# Patient Record
Sex: MALE | Employment: UNEMPLOYED | URBAN - METROPOLITAN AREA
[De-identification: names, ages, dates, MRNs, and addresses within clinical notes are randomized per-mention and may not be internally consistent; named-entity substitution may affect disease eponyms.]

---

## 2019-07-11 ENCOUNTER — HOSPITAL ENCOUNTER (INPATIENT)
Age: 39
LOS: 3 days | Discharge: LEFT BEFORE DISCHARGE | DRG: 179 | End: 2019-07-15
Attending: EMERGENCY MEDICINE | Admitting: INTERNAL MEDICINE

## 2019-07-11 DIAGNOSIS — R00.2 PALPITATIONS: ICD-10-CM

## 2019-07-11 DIAGNOSIS — J98.4 CAVITARY LESION OF LUNG: Primary | ICD-10-CM

## 2019-07-11 DIAGNOSIS — R42 VERTIGO: ICD-10-CM

## 2019-07-11 PROCEDURE — 93005 ELECTROCARDIOGRAM TRACING: CPT | Performed by: EMERGENCY MEDICINE

## 2019-07-11 PROCEDURE — 99285 EMERGENCY DEPT VISIT HI MDM: CPT

## 2019-07-12 ENCOUNTER — APPOINTMENT (OUTPATIENT)
Dept: CT IMAGING | Age: 39
DRG: 179 | End: 2019-07-12

## 2019-07-12 ENCOUNTER — APPOINTMENT (OUTPATIENT)
Dept: GENERAL RADIOLOGY | Age: 39
DRG: 179 | End: 2019-07-12

## 2019-07-12 PROBLEM — A15.0 PULMONARY TUBERCULOSIS: Status: ACTIVE | Noted: 2019-07-12

## 2019-07-12 PROBLEM — J98.4 PULMONARY CAVITARY LESION: Status: ACTIVE | Noted: 2019-07-12

## 2019-07-12 LAB
A/G RATIO: 1.5 (ref 1.1–2.2)
ALBUMIN SERPL-MCNC: 4.8 G/DL (ref 3.4–5)
ALP BLD-CCNC: 88 U/L (ref 40–129)
ALT SERPL-CCNC: 8 U/L (ref 10–40)
ANION GAP SERPL CALCULATED.3IONS-SCNC: 13 MMOL/L (ref 3–16)
APPEARANCE BAL (LAVAGE): ABNORMAL
AST SERPL-CCNC: 17 U/L (ref 15–37)
BASOPHILS ABSOLUTE: 0 K/UL (ref 0–0.2)
BASOPHILS RELATIVE PERCENT: 0.8 %
BILIRUB SERPL-MCNC: 0.3 MG/DL (ref 0–1)
BUN BLDV-MCNC: 10 MG/DL (ref 7–20)
C-REACTIVE PROTEIN: 0.5 MG/L (ref 0–5.1)
CALCIUM SERPL-MCNC: 10.1 MG/DL (ref 8.3–10.6)
CHLORIDE BLD-SCNC: 105 MMOL/L (ref 99–110)
CLOT EVALUATION BAL: ABNORMAL
CO2: 26 MMOL/L (ref 21–32)
COLOR LAVAGE: ABNORMAL
CREAT SERPL-MCNC: 0.7 MG/DL (ref 0.9–1.3)
D DIMER: 363 NG/ML DDU (ref 0–229)
EKG ATRIAL RATE: 75 BPM
EKG DIAGNOSIS: NORMAL
EKG P AXIS: -25 DEGREES
EKG P-R INTERVAL: 152 MS
EKG Q-T INTERVAL: 352 MS
EKG QRS DURATION: 80 MS
EKG QTC CALCULATION (BAZETT): 393 MS
EKG R AXIS: 77 DEGREES
EKG T AXIS: 60 DEGREES
EKG VENTRICULAR RATE: 75 BPM
EOSIN: 2 %
EOSINOPHILS ABSOLUTE: 0.1 K/UL (ref 0–0.6)
EOSINOPHILS RELATIVE PERCENT: 1.2 %
EPITHELIAL CELLS FLUID: 3 %
ETHANOL: NORMAL MG/DL (ref 0–0.08)
GFR AFRICAN AMERICAN: >60
GFR NON-AFRICAN AMERICAN: >60
GLOBULIN: 3.1 G/DL
GLUCOSE BLD-MCNC: 101 MG/DL (ref 70–99)
HAV IGM SER IA-ACNC: NORMAL
HCT VFR BLD CALC: 43.9 % (ref 40.5–52.5)
HEMOGLOBIN: 14.9 G/DL (ref 13.5–17.5)
HEPATITIS B CORE IGM ANTIBODY: NORMAL
HEPATITIS B SURFACE ANTIGEN INTERPRETATION: NORMAL
HEPATITIS C ANTIBODY INTERPRETATION: NORMAL
HIV AG/AB: NORMAL
HIV ANTIGEN: NORMAL
HIV-1 ANTIBODY: NORMAL
HIV-2 AB: NORMAL
LIPASE: 19 U/L (ref 13–60)
LYMPHOCYTES ABSOLUTE: 2.5 K/UL (ref 1–5.1)
LYMPHOCYTES RELATIVE PERCENT: 41.7 %
LYMPHOCYTES, BAL: 9 % (ref 5–10)
MACROPHAGES, BAL: 13 % (ref 90–95)
MCH RBC QN AUTO: 32.1 PG (ref 26–34)
MCHC RBC AUTO-ENTMCNC: 33.9 G/DL (ref 31–36)
MCV RBC AUTO: 94.6 FL (ref 80–100)
MONOCYTES ABSOLUTE: 0.5 K/UL (ref 0–1.3)
MONOCYTES RELATIVE PERCENT: 7.8 %
MONOCYTES, BAL: 2 %
NEUTROPHILS ABSOLUTE: 2.9 K/UL (ref 1.7–7.7)
NEUTROPHILS RELATIVE PERCENT: 48.5 %
NUMBER OF CELLS COUNTED BAL (LAVAGE): 200
PDW BLD-RTO: 12.6 % (ref 12.4–15.4)
PLATELET # BLD: 243 K/UL (ref 135–450)
PMV BLD AUTO: 8 FL (ref 5–10.5)
POTASSIUM SERPL-SCNC: 4.2 MMOL/L (ref 3.5–5.1)
PROCALCITONIN: 0.02 NG/ML (ref 0–0.15)
RBC # BLD: 4.64 M/UL (ref 4.2–5.9)
RBC, BAL: 933 /CUMM
SEDIMENTATION RATE, ERYTHROCYTE: 11 MM/HR (ref 0–15)
SEGMENTED NEUTROPHILS, BAL: 71 % (ref 5–10)
SODIUM BLD-SCNC: 144 MMOL/L (ref 136–145)
TOTAL PROTEIN: 7.9 G/DL (ref 6.4–8.2)
TROPONIN: <0.01 NG/ML
VOLUME LAVAGE: 37 ML
WBC # BLD: 6.1 K/UL (ref 4–11)
WBC/EPI CELLS BAL: 147 /CUMM

## 2019-07-12 PROCEDURE — 87206 SMEAR FLUORESCENT/ACID STAI: CPT

## 2019-07-12 PROCEDURE — 80053 COMPREHEN METABOLIC PANEL: CPT

## 2019-07-12 PROCEDURE — 6360000002 HC RX W HCPCS: Performed by: EMERGENCY MEDICINE

## 2019-07-12 PROCEDURE — 88305 TISSUE EXAM BY PATHOLOGIST: CPT

## 2019-07-12 PROCEDURE — 87116 MYCOBACTERIA CULTURE: CPT

## 2019-07-12 PROCEDURE — 0B9C7ZX DRAINAGE OF RIGHT UPPER LUNG LOBE, VIA NATURAL OR ARTIFICIAL OPENING, DIAGNOSTIC: ICD-10-PCS | Performed by: INTERNAL MEDICINE

## 2019-07-12 PROCEDURE — 70450 CT HEAD/BRAIN W/O DYE: CPT

## 2019-07-12 PROCEDURE — 7100000011 HC PHASE II RECOVERY - ADDTL 15 MIN: Performed by: INTERNAL MEDICINE

## 2019-07-12 PROCEDURE — 85025 COMPLETE CBC W/AUTO DIFF WBC: CPT

## 2019-07-12 PROCEDURE — 80074 ACUTE HEPATITIS PANEL: CPT

## 2019-07-12 PROCEDURE — 2500000003 HC RX 250 WO HCPCS: Performed by: INTERNAL MEDICINE

## 2019-07-12 PROCEDURE — 99152 MOD SED SAME PHYS/QHP 5/>YRS: CPT | Performed by: INTERNAL MEDICINE

## 2019-07-12 PROCEDURE — 87390 HIV-1 AG IA: CPT

## 2019-07-12 PROCEDURE — 2580000003 HC RX 258: Performed by: EMERGENCY MEDICINE

## 2019-07-12 PROCEDURE — 36415 COLL VENOUS BLD VENIPUNCTURE: CPT

## 2019-07-12 PROCEDURE — 84484 ASSAY OF TROPONIN QUANT: CPT

## 2019-07-12 PROCEDURE — 87081 CULTURE SCREEN ONLY: CPT

## 2019-07-12 PROCEDURE — 93010 ELECTROCARDIOGRAM REPORT: CPT | Performed by: INTERNAL MEDICINE

## 2019-07-12 PROCEDURE — 99255 IP/OBS CONSLTJ NEW/EST HI 80: CPT | Performed by: INTERNAL MEDICINE

## 2019-07-12 PROCEDURE — 6370000000 HC RX 637 (ALT 250 FOR IP): Performed by: INTERNAL MEDICINE

## 2019-07-12 PROCEDURE — 86702 HIV-2 ANTIBODY: CPT

## 2019-07-12 PROCEDURE — 31624 DX BRONCHOSCOPE/LAVAGE: CPT | Performed by: INTERNAL MEDICINE

## 2019-07-12 PROCEDURE — 2709999900 HC NON-CHARGEABLE SUPPLY: Performed by: INTERNAL MEDICINE

## 2019-07-12 PROCEDURE — 88312 SPECIAL STAINS GROUP 1: CPT

## 2019-07-12 PROCEDURE — 87252 VIRUS INOCULATION TISSUE: CPT

## 2019-07-12 PROCEDURE — 85652 RBC SED RATE AUTOMATED: CPT

## 2019-07-12 PROCEDURE — 71260 CT THORAX DX C+: CPT

## 2019-07-12 PROCEDURE — 1200000000 HC SEMI PRIVATE

## 2019-07-12 PROCEDURE — 71046 X-RAY EXAM CHEST 2 VIEWS: CPT

## 2019-07-12 PROCEDURE — 96374 THER/PROPH/DIAG INJ IV PUSH: CPT

## 2019-07-12 PROCEDURE — 6360000004 HC RX CONTRAST MEDICATION: Performed by: EMERGENCY MEDICINE

## 2019-07-12 PROCEDURE — 96361 HYDRATE IV INFUSION ADD-ON: CPT

## 2019-07-12 PROCEDURE — 84145 PROCALCITONIN (PCT): CPT

## 2019-07-12 PROCEDURE — 3609010800 HC BRONCHOSCOPY ALVEOLAR LAVAGE: Performed by: INTERNAL MEDICINE

## 2019-07-12 PROCEDURE — 89051 BODY FLUID CELL COUNT: CPT

## 2019-07-12 PROCEDURE — 87015 SPECIMEN INFECT AGNT CONCNTJ: CPT

## 2019-07-12 PROCEDURE — 87205 SMEAR GRAM STAIN: CPT

## 2019-07-12 PROCEDURE — 6360000002 HC RX W HCPCS: Performed by: INTERNAL MEDICINE

## 2019-07-12 PROCEDURE — 85379 FIBRIN DEGRADATION QUANT: CPT

## 2019-07-12 PROCEDURE — 87102 FUNGUS ISOLATION CULTURE: CPT

## 2019-07-12 PROCEDURE — 86140 C-REACTIVE PROTEIN: CPT

## 2019-07-12 PROCEDURE — 6370000000 HC RX 637 (ALT 250 FOR IP): Performed by: EMERGENCY MEDICINE

## 2019-07-12 PROCEDURE — 7100000010 HC PHASE II RECOVERY - FIRST 15 MIN: Performed by: INTERNAL MEDICINE

## 2019-07-12 PROCEDURE — 88112 CYTOPATH CELL ENHANCE TECH: CPT

## 2019-07-12 PROCEDURE — 86480 TB TEST CELL IMMUN MEASURE: CPT

## 2019-07-12 PROCEDURE — 6370000000 HC RX 637 (ALT 250 FOR IP): Performed by: NURSE PRACTITIONER

## 2019-07-12 PROCEDURE — 83690 ASSAY OF LIPASE: CPT

## 2019-07-12 PROCEDURE — 86701 HIV-1ANTIBODY: CPT

## 2019-07-12 PROCEDURE — 87070 CULTURE OTHR SPECIMN AEROBIC: CPT

## 2019-07-12 PROCEDURE — 2580000003 HC RX 258: Performed by: INTERNAL MEDICINE

## 2019-07-12 PROCEDURE — G0480 DRUG TEST DEF 1-7 CLASSES: HCPCS

## 2019-07-12 RX ORDER — FENTANYL CITRATE 50 UG/ML
INJECTION, SOLUTION INTRAMUSCULAR; INTRAVENOUS
Status: COMPLETED | OUTPATIENT
Start: 2019-07-12 | End: 2019-07-12

## 2019-07-12 RX ORDER — LANOLIN ALCOHOL/MO/W.PET/CERES
3 CREAM (GRAM) TOPICAL NIGHTLY PRN
Status: DISCONTINUED | OUTPATIENT
Start: 2019-07-12 | End: 2019-07-15 | Stop reason: HOSPADM

## 2019-07-12 RX ORDER — SODIUM CHLORIDE 0.9 % (FLUSH) 0.9 %
10 SYRINGE (ML) INJECTION EVERY 12 HOURS SCHEDULED
Status: DISCONTINUED | OUTPATIENT
Start: 2019-07-12 | End: 2019-07-15 | Stop reason: HOSPADM

## 2019-07-12 RX ORDER — 0.9 % SODIUM CHLORIDE 0.9 %
1000 INTRAVENOUS SOLUTION INTRAVENOUS ONCE
Status: COMPLETED | OUTPATIENT
Start: 2019-07-12 | End: 2019-07-12

## 2019-07-12 RX ORDER — POTASSIUM CHLORIDE 7.45 MG/ML
10 INJECTION INTRAVENOUS PRN
Status: DISCONTINUED | OUTPATIENT
Start: 2019-07-12 | End: 2019-07-15 | Stop reason: HOSPADM

## 2019-07-12 RX ORDER — MIDAZOLAM HYDROCHLORIDE 1 MG/ML
INJECTION INTRAMUSCULAR; INTRAVENOUS
Status: COMPLETED | OUTPATIENT
Start: 2019-07-12 | End: 2019-07-12

## 2019-07-12 RX ORDER — SODIUM CHLORIDE 0.9 % (FLUSH) 0.9 %
10 SYRINGE (ML) INJECTION PRN
Status: DISCONTINUED | OUTPATIENT
Start: 2019-07-12 | End: 2019-07-15 | Stop reason: HOSPADM

## 2019-07-12 RX ORDER — METRONIDAZOLE 500 MG/1
500 TABLET ORAL EVERY 8 HOURS SCHEDULED
Status: DISCONTINUED | OUTPATIENT
Start: 2019-07-12 | End: 2019-07-14

## 2019-07-12 RX ORDER — NICOTINE 21 MG/24HR
1 PATCH, TRANSDERMAL 24 HOURS TRANSDERMAL DAILY
Status: DISCONTINUED | OUTPATIENT
Start: 2019-07-12 | End: 2019-07-12

## 2019-07-12 RX ORDER — HYDROXYZINE PAMOATE 25 MG/1
50 CAPSULE ORAL 3 TIMES DAILY PRN
Status: DISCONTINUED | OUTPATIENT
Start: 2019-07-12 | End: 2019-07-15 | Stop reason: HOSPADM

## 2019-07-12 RX ORDER — ONDANSETRON 2 MG/ML
4 INJECTION INTRAMUSCULAR; INTRAVENOUS EVERY 6 HOURS PRN
Status: DISCONTINUED | OUTPATIENT
Start: 2019-07-12 | End: 2019-07-15 | Stop reason: HOSPADM

## 2019-07-12 RX ORDER — LIDOCAINE HYDROCHLORIDE 20 MG/ML
INJECTION, SOLUTION EPIDURAL; INFILTRATION; INTRACAUDAL; PERINEURAL
Status: COMPLETED | OUTPATIENT
Start: 2019-07-12 | End: 2019-07-12

## 2019-07-12 RX ORDER — LORAZEPAM 2 MG/ML
1 INJECTION INTRAMUSCULAR ONCE
Status: COMPLETED | OUTPATIENT
Start: 2019-07-12 | End: 2019-07-12

## 2019-07-12 RX ORDER — MAGNESIUM SULFATE 1 G/100ML
1 INJECTION INTRAVENOUS PRN
Status: DISCONTINUED | OUTPATIENT
Start: 2019-07-12 | End: 2019-07-15 | Stop reason: HOSPADM

## 2019-07-12 RX ORDER — NICOTINE 21 MG/24HR
1 PATCH, TRANSDERMAL 24 HOURS TRANSDERMAL DAILY
Status: DISCONTINUED | OUTPATIENT
Start: 2019-07-12 | End: 2019-07-15 | Stop reason: HOSPADM

## 2019-07-12 RX ADMIN — LORAZEPAM 1 MG: 2 INJECTION INTRAMUSCULAR; INTRAVENOUS at 00:38

## 2019-07-12 RX ADMIN — METRONIDAZOLE 500 MG: 500 TABLET, FILM COATED ORAL at 16:42

## 2019-07-12 RX ADMIN — IOVERSOL 100 ML: 678 INJECTION INTRA-ARTERIAL; INTRAVENOUS at 01:18

## 2019-07-12 RX ADMIN — Medication 3 MG: at 21:46

## 2019-07-12 RX ADMIN — SODIUM CHLORIDE 1000 ML: 9 INJECTION, SOLUTION INTRAVENOUS at 00:38

## 2019-07-12 RX ADMIN — METRONIDAZOLE 500 MG: 500 TABLET, FILM COATED ORAL at 21:46

## 2019-07-12 RX ADMIN — SODIUM CHLORIDE, PRESERVATIVE FREE 10 ML: 5 INJECTION INTRAVENOUS at 09:07

## 2019-07-12 ASSESSMENT — PAIN DESCRIPTION - DESCRIPTORS: DESCRIPTORS: ACHING

## 2019-07-12 ASSESSMENT — ENCOUNTER SYMPTOMS
COLOR CHANGE: 0
FACIAL SWELLING: 0
VOMITING: 0
ABDOMINAL PAIN: 0
PHOTOPHOBIA: 0
BLOOD IN STOOL: 0
NAUSEA: 0
BACK PAIN: 0
TROUBLE SWALLOWING: 0
VOICE CHANGE: 0
SHORTNESS OF BREATH: 1
STRIDOR: 0
WHEEZING: 0

## 2019-07-12 ASSESSMENT — PAIN DESCRIPTION - FREQUENCY: FREQUENCY: CONTINUOUS

## 2019-07-12 ASSESSMENT — PAIN DESCRIPTION - ONSET: ONSET: ON-GOING

## 2019-07-12 ASSESSMENT — PAIN SCALES - GENERAL
PAINLEVEL_OUTOF10: 3
PAINLEVEL_OUTOF10: 0

## 2019-07-12 ASSESSMENT — PAIN DESCRIPTION - PROGRESSION
CLINICAL_PROGRESSION: NOT CHANGED

## 2019-07-12 ASSESSMENT — PAIN DESCRIPTION - ORIENTATION: ORIENTATION: LEFT;RIGHT

## 2019-07-12 ASSESSMENT — PAIN - FUNCTIONAL ASSESSMENT
PAIN_FUNCTIONAL_ASSESSMENT: ACTIVITIES ARE NOT PREVENTED
PAIN_FUNCTIONAL_ASSESSMENT: 0-10

## 2019-07-12 ASSESSMENT — PAIN DESCRIPTION - LOCATION: LOCATION: CHEST

## 2019-07-12 ASSESSMENT — PAIN DESCRIPTION - PAIN TYPE: TYPE: ACUTE PAIN

## 2019-07-12 NOTE — ED NOTES
Acknowledged pt by pt's name. Verified pt by name and date of birth. Checked arm band, allergies, reviewed past medical history. Introduced myself to patient  Duration of ED plan of care explained to patient  Explained planned tests and procedures  Thanked patient for coming to [facility].    Asked if there was anything else I could do for the patient before exiting room. CB in reach.      Garth Yee RN  07/12/19 5790

## 2019-07-12 NOTE — CONSULTS
PATIENT IS SEEN AT THE REQUEST OF DR. Jen David for pulmonary TB    CONSULTING PHYSICIAN: Esa     HISTORY OF PRESENT ILLNESS:  This is a 43 y.o. male who presented to the ED on 7/11 with a CC of dizziness. Seems to be having a lot of complaints. Dizziness, chest wall pain, cough and fatigue. He is from [facility] and would not directly answer my question with regards to TB risk factors. He denied having a positive skin test or knowing anyone with TB. [place].  He denies being homeless, denies being incarcerate, denies IV drug use or HIV risks. Denies any medical problems. .      Established Pulmonologist:      PAST MEDICAL HISTORY:  History reviewed. No pertinent past medical history. PAST SURGICAL HISTORY:  History reviewed. No pertinent surgical history. FAMILY HISTORY:  Unclear     SOCIAL HISTORY:   reports that he has been smoking cigarettes. He has been smoking about 1.00 pack per day. He has never used smokeless tobacco. Smokes marijuana. Uses ETOH but does not abuse it. Told RN he had vaccine for TB (he did not tell me that)    Scheduled Meds:   nicotine  1 patch Transdermal Daily    sodium chloride flush  10 mL Intravenous 2 times per day    enoxaparin  40 mg Subcutaneous Daily       Continuous Infusions:      PRN Meds:  sodium chloride flush, ondansetron, potassium chloride, magnesium sulfate    ALLERGIES:  Patient is allergic to penicillins.     REVIEW OF SYSTEMS:  Constitutional: Negative for fever or chills  HENT: Negative for sore throat  Eyes: Negative for redness   Respiratory: Cough  Cardiovascular: left sided chest wall pains  Gastrointestinal: Negative for vomiting, diarrhea   Genitourinary: Negative for hematuria, negative for dysuria  Musculoskeletal: Negative for arthralgias   Skin: Negative for rash  Neurological: Negative for syncope  Hematological: Negative for adenopathy  Extremities:  Negative for swelling    PHYSICAL
Value Date    CREATININE 0.7 (L) 07/12/2019    BUN 10 07/12/2019     07/12/2019    K 4.2 07/12/2019     07/12/2019    CO2 26 07/12/2019       Hepatic Function Panel:   Lab Results   Component Value Date    ALKPHOS 88 07/12/2019    ALT 8 07/12/2019    AST 17 07/12/2019    PROT 7.9 07/12/2019    BILITOT 0.3 07/12/2019    LABALBU 4.8 07/12/2019     UA:  Lab Results   Component Value Date    COLORU YELLOW 10/20/2016    CLARITYU CLOUDY 10/20/2016    GLUCOSEU Negative 10/20/2016    BILIRUBINUR Negative 10/20/2016    KETUA Negative 10/20/2016    SPECGRAV 1.018 10/20/2016    BLOODU Negative 10/20/2016    PHUR 8.0 10/20/2016    PROTEINU Negative 10/20/2016    UROBILINOGEN 1.0 10/20/2016    NITRU Negative 10/20/2016    LEUKOCYTESUR Negative 10/20/2016    LABMICR YES 10/20/2016    URINETYPE Not Specified 10/20/2016      Urine Microscopic:   Lab Results   Component Value Date    HYALCAST 0 10/20/2016    WBCUA 0 10/20/2016    RBCUA 6 10/20/2016    EPIU 0 10/20/2016         Scheduled Meds:   nicotine  1 patch Transdermal Daily    sodium chloride flush  10 mL Intravenous 2 times per day    enoxaparin  40 mg Subcutaneous Daily       Continuous Infusions:      PRN Meds:  sodium chloride flush, ondansetron, potassium chloride, magnesium sulfate    MICRO: cultures reviewed and updated by me          Natty Zavala [[id number]] Collected: 07/12/19 1045   Order Status: Sent Specimen: Blood Updated: 07/12/19 1045   Quantiferon, Incubated [[id number]]    Order Status: Canceled Specimen: Blood          Urine Culture  No results for input(s): LABURIN in the last 72 hours. Imaging:   CT CHEST PULMONARY EMBOLISM W CONTRAST   Final Result   1. No evidence of pulmonary embolism   2. Cavitary nodular infiltrate in the right lung apex having the appearance   of an infectious process. Tuberculosis is a consideration         XR CHEST STANDARD (2 VW)   Final Result   1. No active cardiopulmonary disease   2.  Right apical lung

## 2019-07-12 NOTE — H&P
Hospital Medicine History & Physical      PCP: No primary care provider on file. Date of Admission: 7/11/2019    Date of Service: Pt seen/examined on 7/12/2019 and Admitted to Inpatient. Chief Complaint:  Weakness, fatigue      History Of Present Illness: The patient is a 43 y.o. male with hx tobacco abuse who presents to [facility] with weakness and fatigue. Patient states that over the past three or four days, he has not felt well, with increased fatigue and dizziness. States that he has had to turn up the air conditioner in his car and at home because he was feeling warm. Also having chills. Also having some left sided chest tightness that feels like a spasm to him. Originally from [facility] and emigrated 3 years ago to the [facility] and has lived here since. Since any known exposure to TB, denies any employment in the [employer] or being in care home or homeless. Denies IVDU. States he was tested for TB and HIV in [facility] and was negative. In the ED, pt had a CT Chest that showed a right apical cavitary lesion. He is being admitted for further evaluation. Past Medical History:    Tobacco abuse    Past Surgical History:    No past surgeries    Medications Prior to Admission:    No medications    Allergies:  Penicillins    Social History:  The patient currently lives at home    TOBACCO:   reports that he has been smoking cigarettes. He has been smoking about 1.00 pack per day. He has never used smokeless tobacco.  ETOH:   reports that he drinks alcohol. Family History:  Reviewed in detail and negative for DM, Early CAD, Cancer, CVA. Positive as follows:    History reviewed. No pertinent family history. REVIEW OF SYSTEMS:   Positive for and as noted in the HPI. All other systems reviewed and negative.     PHYSICAL EXAM:    /77   Pulse 77 Temp 97.7 °F (36.5 °C) (Oral)   Resp 13   Ht 6' 2\" (1.88 m)   Wt 134 lb 11.2 oz (61.1 kg)   SpO2 100%   BMI 17.29 kg/m²     General appearance: No apparent distress appears stated age and cooperative. HEENT Normal cephalic, atraumatic without obvious deformity. Pupils equal, round, and reactive to light. Extra ocular muscles intact. Conjunctivae/corneas clear. Neck: Supple, No jugular venous distention/bruits. Trachea midline without thyromegaly or adenopathy with full range of motion. Lungs: Clear to auscultation, bilaterally without Rales/Wheezes/Rhonchi with good respiratory effort. Heart: Regular rate and rhythm with Normal S1/S2 without murmurs, rubs or gallops, point of maximum impulse non-displaced  Abdomen: Soft, non-tender or non-distended without rigidity or guarding and positive bowel sounds all four quadrants. Extremities: No clubbing, cyanosis, or edema bilaterally. Full range of motion without deformity and normal gait intact. Skin: Skin color, texture, turgor normal.  No rashes or lesions. Neurologic: Alert and oriented X 3, neurovascularly intact with sensory/motor intact upper extremities/lower extremities, bilaterally. Cranial nerves: II-XII intact, grossly non-focal.  Mental status: Alert, oriented, thought content appropriate. Capillary Refill: Acceptable  < 3 seconds  Peripheral Pulses: +3 Easily felt, not easily obliterated with pressure      CXR:  I have reviewed the CXR with the following interpretation: right apical lung nodule  EKG:  I have reviewed the EKG with the following interpretation: NSR    CT CHEST PULMONARY EMBOLISM W CONTRAST   Final Result   1. No evidence of pulmonary embolism   2. Cavitary nodular infiltrate in the right lung apex having the appearance   of an infectious process. Tuberculosis is a consideration         XR CHEST STANDARD (2 VW)   Final Result   1. No active cardiopulmonary disease   2. Right apical lung nodule.   Recommend characterization

## 2019-07-12 NOTE — ED NOTES
Registration comes to desk and states that the pt does want to be seen but wants a different staff member, told that there is no problems  Being seen and may return to same room.      Jair Ratliff RN  07/12/19 0004

## 2019-07-12 NOTE — H&P
Pre-procedural H&P      HPI:    Here for elective bronchoscopy for TB rule out      History reviewed. No pertinent past medical history. History reviewed. No pertinent surgical history.     Allergies   Allergen Reactions    Penicillins        Current Facility-Administered Medications   Medication Dose Route Frequency Provider Last Rate Last Dose    nicotine (NICODERM CQ) 21 MG/24HR 1 patch  1 patch Transdermal Daily Faustina Miramontes MD   1 patch at 07/12/19 0241    sodium chloride flush 0.9 % injection 10 mL  10 mL Intravenous 2 times per day Jethro Alaniz MD   10 mL at 07/12/19 0907    sodium chloride flush 0.9 % injection 10 mL  10 mL Intravenous PRN Bob Rogers MD        ondansetron (ZOFRAN) injection 4 mg  4 mg Intravenous Q6H PRN Bob Rogers MD        enoxaparin (LOVENOX) injection 40 mg  40 mg Subcutaneous Daily Bob Rogers MD        potassium chloride 10 mEq/100 mL IVPB (Peripheral Line)  10 mEq Intravenous PRN Bob Rogers MD        magnesium sulfate 1 g in dextrose 5% 100 mL IVPB  1 g Intravenous PRN Jethro Alaniz MD           ROS:  GENERAL:  No fevers, chills, or night sweats  HEENT:  No double vision, blurry vision, or difficulty swallowing  HEART:  No chest pain, no palpitations  LUNGS:  SOB, coughing, chest wall pains  ABDOMEN:  No abdominal pains, no changes in stools  GENITOURINARY:  No increased frequency, no blood in urine  EXTREMITIES:  No swelling, no lesions  NEURO:  No numbness or tingling, no seizures  SKIN:  No new rashes, no changes in hair or nails  LYMPH:  No masses, no swelling neck, armpits, or groin    GENERAL:  Well nourished, alert, appears stated age, no distress  HEENT:  No scleral icterus, no conjunctival irritation  NECK:  No thyromegaly, no bruits  LYMPH:  No cervical or supraclavicular adenopathy  HEART:  Regular rate and rhythm, no murmurs  LUNGS:  clear  ABDOMEN:  No distention, no organomegaly  EXTREMITIES:  No edema, no digital clubbing  NEURO:  No localizing deficits, CN II-XII intact    Assessment/Plan:  1.   Cavitary lung lesion, ASA 2   -BAL, airway exam, airborne precautions for procedure    Michelle An DO  [facility] Pulmonary, Sleep and Critical Care  [phone]

## 2019-07-13 PROCEDURE — 87206 SMEAR FLUORESCENT/ACID STAI: CPT

## 2019-07-13 PROCEDURE — 6370000000 HC RX 637 (ALT 250 FOR IP): Performed by: NURSE PRACTITIONER

## 2019-07-13 PROCEDURE — 87015 SPECIMEN INFECT AGNT CONCNTJ: CPT

## 2019-07-13 PROCEDURE — 87116 MYCOBACTERIA CULTURE: CPT

## 2019-07-13 PROCEDURE — 6370000000 HC RX 637 (ALT 250 FOR IP): Performed by: INTERNAL MEDICINE

## 2019-07-13 PROCEDURE — 1200000000 HC SEMI PRIVATE

## 2019-07-13 RX ORDER — MECLIZINE HCL 12.5 MG/1
12.5 TABLET ORAL 3 TIMES DAILY PRN
Status: DISCONTINUED | OUTPATIENT
Start: 2019-07-13 | End: 2019-07-15 | Stop reason: HOSPADM

## 2019-07-13 RX ORDER — IBUPROFEN 400 MG/1
400 TABLET ORAL EVERY 8 HOURS PRN
Status: DISCONTINUED | OUTPATIENT
Start: 2019-07-13 | End: 2019-07-15 | Stop reason: HOSPADM

## 2019-07-13 RX ORDER — ACETAMINOPHEN 325 MG/1
650 TABLET ORAL EVERY 8 HOURS PRN
Status: DISCONTINUED | OUTPATIENT
Start: 2019-07-13 | End: 2019-07-15 | Stop reason: HOSPADM

## 2019-07-13 RX ADMIN — Medication 3 MG: at 21:42

## 2019-07-13 RX ADMIN — IBUPROFEN 400 MG: 400 TABLET ORAL at 04:22

## 2019-07-13 RX ADMIN — HYDROXYZINE PAMOATE 50 MG: 25 CAPSULE ORAL at 04:22

## 2019-07-13 RX ADMIN — METRONIDAZOLE 500 MG: 500 TABLET, FILM COATED ORAL at 04:36

## 2019-07-13 RX ADMIN — IBUPROFEN 400 MG: 400 TABLET ORAL at 21:41

## 2019-07-13 RX ADMIN — METRONIDAZOLE 500 MG: 500 TABLET, FILM COATED ORAL at 13:19

## 2019-07-13 RX ADMIN — METRONIDAZOLE 500 MG: 500 TABLET, FILM COATED ORAL at 21:42

## 2019-07-13 RX ADMIN — MECLIZINE 12.5 MG: 12.5 TABLET ORAL at 21:41

## 2019-07-13 RX ADMIN — HYDROXYZINE PAMOATE 50 MG: 25 CAPSULE ORAL at 17:29

## 2019-07-13 ASSESSMENT — PAIN DESCRIPTION - PROGRESSION
CLINICAL_PROGRESSION: NOT CHANGED

## 2019-07-13 ASSESSMENT — PAIN SCALES - GENERAL
PAINLEVEL_OUTOF10: 5
PAINLEVEL_OUTOF10: 0
PAINLEVEL_OUTOF10: 10

## 2019-07-13 NOTE — FLOWSHEET NOTE
Pt highly agitated at this time. Telling staff to get the F out of his room and who cares about the F doctor. PCA advised pt that nurse was contacting MD re pain meds. Pt c/o overall pain and is having high anxiety at this time. MD notified. Dr. Adiel Burgess gave telephone order for Tyl 650 q8 hr prn and Ibuprofen 400 mg q8hr prn.

## 2019-07-13 NOTE — FLOWSHEET NOTE
Patient called nurse to room stating that he feels dizzy and demanding to see the MD. VSS. MD messaged. Patient stated that he needs to go outside for fresh air. Nurse spoke with pt regarding not going outside. Patient still upset regarding not being able to go outside. Patient requesting to go smoke. Spoke with patient that he cannot go outside to smoke. Nurse offered nicotine patch. Patient continuing to refuse. States he needs to go outside.  Will continue to monitor Electronically signed by Indiana Cruz RN on 7/13/2019 at 2:36 PM

## 2019-07-14 LAB
CULTURE, RESPIRATORY: NORMAL
GRAM STAIN RESULT: NORMAL

## 2019-07-14 PROCEDURE — 99232 SBSQ HOSP IP/OBS MODERATE 35: CPT | Performed by: INTERNAL MEDICINE

## 2019-07-14 PROCEDURE — 99233 SBSQ HOSP IP/OBS HIGH 50: CPT | Performed by: INTERNAL MEDICINE

## 2019-07-14 PROCEDURE — 6370000000 HC RX 637 (ALT 250 FOR IP): Performed by: INTERNAL MEDICINE

## 2019-07-14 PROCEDURE — 1200000000 HC SEMI PRIVATE

## 2019-07-14 PROCEDURE — 6370000000 HC RX 637 (ALT 250 FOR IP): Performed by: NURSE PRACTITIONER

## 2019-07-14 RX ORDER — LEVOFLOXACIN 5 MG/ML
750 INJECTION, SOLUTION INTRAVENOUS EVERY 24 HOURS
Status: DISCONTINUED | OUTPATIENT
Start: 2019-07-14 | End: 2019-07-14

## 2019-07-14 RX ORDER — ALBUTEROL SULFATE 90 UG/1
2 AEROSOL, METERED RESPIRATORY (INHALATION) EVERY 6 HOURS PRN
Status: DISCONTINUED | OUTPATIENT
Start: 2019-07-14 | End: 2019-07-15 | Stop reason: HOSPADM

## 2019-07-14 RX ORDER — LORAZEPAM 0.5 MG/1
0.5 TABLET ORAL EVERY 4 HOURS PRN
Status: DISCONTINUED | OUTPATIENT
Start: 2019-07-14 | End: 2019-07-15 | Stop reason: HOSPADM

## 2019-07-14 RX ADMIN — Medication 3 MG: at 22:23

## 2019-07-14 RX ADMIN — METRONIDAZOLE 500 MG: 500 TABLET, FILM COATED ORAL at 06:45

## 2019-07-14 RX ADMIN — IBUPROFEN 400 MG: 400 TABLET ORAL at 22:23

## 2019-07-14 RX ADMIN — MECLIZINE 12.5 MG: 12.5 TABLET ORAL at 22:23

## 2019-07-14 RX ADMIN — LORAZEPAM 0.5 MG: 0.5 TABLET ORAL at 22:23

## 2019-07-14 RX ADMIN — LEVOFLOXACIN 750 MG: 500 TABLET, FILM COATED ORAL at 12:44

## 2019-07-14 RX ADMIN — METRONIDAZOLE 500 MG: 500 TABLET, FILM COATED ORAL at 12:44

## 2019-07-14 ASSESSMENT — PAIN DESCRIPTION - PROGRESSION
CLINICAL_PROGRESSION: NOT CHANGED

## 2019-07-14 ASSESSMENT — PAIN SCALES - GENERAL
PAINLEVEL_OUTOF10: 4
PAINLEVEL_OUTOF10: 0

## 2019-07-14 NOTE — DISCHARGE INSTR - COC
Continuity of Care Form    Patient Name: John Bass   :  1980  MRN:  [id number]    Admit date:  2019  Discharge date:  ***    Code Status Order: Full Code   Advance Directives:   [facility] Documentation     Date/Time Healthcare Directive Type of Healthcare Directive Copy in [facility] [address] Agent's Name Healthcare Agent's Phone Number    19 1006  No, patient does not have an advance directive for healthcare treatment -- -- -- -- --    19 0859  No, patient does not have an advance directive for healthcare treatment -- -- -- -- --          Admitting Physician:  Kirt Antonio MD  PCP: No primary care provider on file. Discharging Nurse: [facility] Unit/Room#: B0W-3313/2544-31  Discharging Unit Phone Number: ***    Emergency Contact:   Extended Emergency Contact Information  Primary Emergency Contact: [address] Phone: [phone]  Relation: Other    Past Surgical History:  Past Surgical History:   Procedure Laterality Date    BRONCHOSCOPY N/A 2019    BRONCHOSCOPY ALVEOLAR LAVAGE performed by Eloisa Castillo DO at [facility] ENDOSCOPY       Immunization History: There is no immunization history on file for this patient.     Active Problems:  Patient Active Problem List   Diagnosis Code    Cavitary lesion of lung J98.4    Pulmonary tuberculosis A15.0    Tobacco use Z72.0    Chest wall pain R07.89    Palpitations R00.2    Vertigo R42    Poor dentition K08.9    Other emphysema (HCC) J43.8       Isolation/Infection:   Isolation          Airborne            Nurse Assessment:  Last Vital Signs: BP 99/66   Pulse 80   Temp 97.4 °F (36.3 °C) (Axillary)   Resp 18   Ht 6' 2\" (1.88 m)   Wt 134 lb 7.7 oz (61 kg)   SpO2 100%   BMI 17.27 kg/m²     Last documented pain score (0-10 scale): Pain Level: 0  Last Weight:   Wt Readings from Last 1 Encounters:   19 134 lb 7.7 oz (61 kg)     Mental Status: {IP PT MENTAL STATUS:}    IV Access:  { KARISSA IV ACCESS:623015280}    Nursing Mobility/ADLs:  Walking   {CHP DME IMSR:106230193}  Transfer  {CHP DME WNWE:841106946}  Bathing  {CHP DME VVQD:151587427}  Dressing  {CHP DME GDVB:334740358}  Toileting  {CHP DME ZCL}  Feeding  {P DME IKLS:856524745}  Med Admin  {CHP DME QQDS:821244531}  Med Delivery   { KARISSA MED Delivery:747152185}    Wound Care Documentation and Therapy:        Elimination:  Continence:   · Bowel: {YES / SE:74565}  · Bladder: {YES / }  Urinary Catheter: {Urinary Catheter:959292340}   Colostomy/Ileostomy/Ileal Conduit: {YES / :52378}       Date of Last BM: ***    Intake/Output Summary (Last 24 hours) at 2019 1853  Last data filed at 2019 1730  Gross per 24 hour   Intake 720 ml   Output --   Net 720 ml     I/O last 3 completed shifts:   In: 1200 [P.O.:1200]  Out: -     Safety Concerns:     [address] [employer] Safety Concerns:337912194}    Impairments/Disabilities:      [address] [employer] Impairments/Disabilities:291450942}    Nutrition Therapy:  Current Nutrition Therapy:   [address] Diet List:094833194}    Routes of Feeding: {[facility] DME Other Feedings:308396411}  Liquids: {Slp liquid thickness:98062}  Daily Fluid Restriction: {CHP DME Yes amt example:677267905}  Last Modified Barium Swallow with Video (Video Swallowing Test): {Done Not Done PVQR:836866192}    Treatments at the Time of Hospital Discharge:   Respiratory Treatments: ***  Oxygen Therapy:  {Therapy; copd oxygen:54266}  Ventilator:    {[facility] Vent VDFT:403073781}    Rehab Therapies: {THERAPEUTIC INTERVENTION:9678263592}  Weight Bearing Status/Restrictions: [address] [employer]  Weight Bearin}  Other Medical Equipment (for information only, NOT a DME order):  {EQUIPMENT:283543130}  Other Treatments: ***    Patient's personal belongings (please select all that are sent with patient):  {JENNIFER DME Belongings:359908341}    RN SIGNATURE:  {Esignature:945339786}    CASE MANAGEMENT/SOCIAL WORK SECTION    Inpatient Status Date: ***    Readmission Risk Assessment Score:  Readmission Risk              Risk of Unplanned Readmission:        6           Discharging to Facility/ Agency   · Name:   · Address:  · Phone:  · Fax:    Dialysis Facility (if applicable)   · Name:  · Address:  · Dialysis Schedule:  · Phone:  · Fax:    / signature: {Esignature:978375616}    PHYSICIAN SECTION    Prognosis: {Prognosis:1579071156}    Condition at Discharge: [address] [place] [address] Patient Condition:131977317}    Rehab Potential (if transferring to Rehab): {Prognosis:0338486227}    Recommended Labs or Other Treatments After Discharge: ***    Physician Certification: I certify the above information and transfer of Maury Africa  is necessary for the continuing treatment of the diagnosis listed and that he requires {Admit to Appropriate Level of Care:04171} for {GREATER/LESS:531531626} 30 days.      Update Admission H&P: {CHP DME Changes in NLXZX:004195250}    PHYSICIAN SIGNATURE:  {Esignature:611698664}

## 2019-07-14 NOTE — PLAN OF CARE
Remains on antibiotic therapy with no adverse effects. Pt free from falls this shift. Fall precautions in place at all times. Call light always within reach. Pt able and agreeable to contact for safety appropriately. Daily care needs met with assist from staff. Pt able to express presence/absence of pain and rate pain appropriately using numerical scale. Pain/discomfort being managed with PRN analgesics per MD orders (see MAR). Pain assessed every shift and after interventions. Discharge planning remains ongoing.

## 2019-07-14 NOTE — PLAN OF CARE
Problem: Infection:  Goal: Will remain free from infection  Description  Will remain free from infection  7/14/2019 0901 by Ra Yanez RN  Outcome: Ongoing  7/13/2019 2309 by Doyne Duane, RN  Outcome: Ongoing   Patient will remain free of infection. Problem: Safety:  Goal: Free from accidental physical injury  Description  Free from accidental physical injury  7/14/2019 0901 by Ra Yanez RN  Outcome: Ongoing  7/13/2019 2309 by Doyne Duane, RN  Outcome: Ongoing  Goal: Free from intentional harm  Description  Free from intentional harm  7/14/2019 0901 by Ra Yanez RN  Outcome: Ongoing  7/13/2019 2309 by Doyne Duane, RN  Outcome: Ongoing   Pt A&O aware of his/her abilities. Pt understands and knows to call when in need of ambulation. Measures were made to prevent accidental needle stick, friction, shear, etc. Environment kept free of clutter and adequate lighting provided. Will continue to monitor for physical injury. Problem: Pain:  Goal: Patient's pain/discomfort is manageable  Description  Patient's pain/discomfort is manageable  7/14/2019 0901 by Ra Yanez RN  Outcome: Ongoing  7/13/2019 2309 by Doyne Duane, RN  Outcome: Ongoing  Goal: Pain level will decrease  Description  Pain level will decrease  7/14/2019 0901 by Ra Yanez RN  Outcome: Ongoing  7/13/2019 2309 by Doyne Duane, RN  Outcome: Ongoing  Goal: Control of acute pain  Description  Control of acute pain  7/14/2019 0901 by Ra Yanez RN  Outcome: Ongoing  7/13/2019 2309 by Doyne Duane, RN  Outcome: Ongoing  Goal: Control of chronic pain  Description  Control of chronic pain  7/14/2019 0901 by Ra Yanez RN  Outcome: Ongoing  7/13/2019 2309 by Doyne Duane, RN  Outcome: Ongoing   Pain/discomfort being managed with PRN analgesics per MD order.   Pt able to express presence and absence of pain and rate pain appropriately using numerical scale    Problem: Skin Integrity:  Goal:

## 2019-07-14 NOTE — FLOWSHEET NOTE
Patient refusing lab draw. Attempted to educate pt about needing the labs but patient still refusing. Will reattempt later.  Electronically signed by Jean Paul Oliveira RN on 7/14/2019 at 3:55 PM

## 2019-07-14 NOTE — FLOWSHEET NOTE
Patient called nurse to room and stated that he doesn't trust the hospital anymore. He is not taking any medications here. He doesn't believe the test results that were reported to him. He is requesting to be transferred to [facility]. SW aware. MD notified. No medical necessity to transfer at this time. Patient offered medication to help calm his anxiety. States no I will not take any meds.  Electronically signed by Emily Corbett RN on 7/14/2019 at 4:57 PM

## 2019-07-15 VITALS
BODY MASS INDEX: 17.34 KG/M2 | HEART RATE: 75 BPM | OXYGEN SATURATION: 100 % | DIASTOLIC BLOOD PRESSURE: 60 MMHG | SYSTOLIC BLOOD PRESSURE: 111 MMHG | TEMPERATURE: 97.1 F | RESPIRATION RATE: 20 BRPM | HEIGHT: 74 IN | WEIGHT: 135.14 LBS

## 2019-07-15 PROCEDURE — 6370000000 HC RX 637 (ALT 250 FOR IP): Performed by: INTERNAL MEDICINE

## 2019-07-15 RX ADMIN — MECLIZINE 12.5 MG: 12.5 TABLET ORAL at 13:02

## 2019-07-15 RX ADMIN — LORAZEPAM 0.5 MG: 0.5 TABLET ORAL at 13:02

## 2019-07-15 ASSESSMENT — ENCOUNTER SYMPTOMS
WHEEZING: 0
BACK PAIN: 0
TROUBLE SWALLOWING: 0
SHORTNESS OF BREATH: 1
EYE REDNESS: 0
ABDOMINAL PAIN: 0
CONSTIPATION: 0
EYE DISCHARGE: 0
COUGH: 1
DIARRHEA: 0
SORE THROAT: 0
RHINORRHEA: 0
NAUSEA: 0

## 2019-07-15 NOTE — PROGRESS NOTES
Patient was recovered in the Endoscopy department. Patient is sleepy but arouses easily and has no complaints of pain. Patient is breathing easily with no cough noted.
Per patient his address is [address] [place] [address]
Spoke with Talya Wynn at [place] [address] in reference to patient wanting to leave [place]. Roeny Conteh spoke with Dr. Yeny Cordova, infectious disease, and he stated unless we have confirmed diagnosis of TB, the patient can leave the facility, and if the results are positive, the local health department will track the patient down for treatment. Address verification would be appreciated for follow-up with the correct health department.
cessation counseling     Chest wall pain  -likely MSK, spasm  -chronic      DVT Prophylaxis: Lovenox  Diet: DIET GENERAL;  Code Status: Full Code    PT/OT Eval Status: defer    Dispo - continue care; it has been brought to my attention that the patient has threatened to leave AMA. Until we rule out TB, he needs to stay in the hospital in airborne isolation. This was explained to the patient.      Bebeto Simmons MD
Negative for hallucinations and suicidal ideas. All other systems reviewed and are negative. Past Medical History: All past medical history reviewed today. History reviewed. No pertinent past medical history. Past Surgical History: All past surgical history was reviewed today. Past Surgical History:   Procedure Laterality Date    BRONCHOSCOPY N/A 7/12/2019    BRONCHOSCOPY ALVEOLAR LAVAGE performed by Venancio Alejo DO at [facility]         Immunization History: All immunization history was reviewed by me today. There is no immunization history on file for this patient. Family History: All family history was reviewed today. History reviewed. No pertinent family history. Objective:       PHYSICAL EXAM:      Vitals:   Vitals:    07/13/19 1853 07/13/19 2055 07/14/19 0646 07/14/19 0650   BP: 116/66 108/63 93/65    Pulse: 87 87 56    Resp:  18 18    Temp:  98.5 °F (36.9 °C) 97.4 °F (36.3 °C)    TempSrc:  Oral Axillary    SpO2: 100% 99% 100%    Weight:    134 lb 7.7 oz (61 kg)   Height:           Physical Exam   Constitutional: He is oriented to person, place, and time. He appears well-developed. HENT:   Head: Normocephalic and atraumatic. Mouth/Throat: Oropharynx is clear and moist. No oropharyngeal exudate. Eyes: Pupils are equal, round, and reactive to light. Conjunctivae and EOM are normal. Right eye exhibits no discharge. Left eye exhibits no discharge. No scleral icterus. Neck: Normal range of motion. Neck supple. Cardiovascular: Normal rate and regular rhythm. Exam reveals no friction rub. No murmur heard. Pulmonary/Chest: No stridor. No respiratory distress. He has no wheezes. He has rales (Occasional crackles in the right upper lobe area). Abdominal: Soft. Bowel sounds are normal. There is no tenderness. There is no rebound and no guarding. Musculoskeletal: Normal range of motion. He exhibits no edema or tenderness.    Lymphadenopathy:     He has no

## 2019-07-17 ENCOUNTER — TELEPHONE (OUTPATIENT)
Dept: PULMONOLOGY | Age: 39
End: 2019-07-17

## 2019-07-17 LAB
QUANTI TB GOLD PLUS: NORMAL
QUANTI TB1 MINUS NIL: NORMAL (ref 0–0.34)
QUANTI TB2 MINUS NIL: NORMAL (ref 0–0.34)
QUANTIFERON MITOGEN: NORMAL IU/ML
QUANTIFERON NIL: NORMAL IU/ML

## 2019-07-17 NOTE — TELEPHONE ENCOUNTER
Phone call from [facility] stating ASB was sent to [facility] ([facility]) and came back as MPBC Negative

## 2019-07-20 LAB
FINAL REPORT: NORMAL
PRELIMINARY: NORMAL

## 2019-07-24 LAB
FINAL REPORT: NORMAL
PRELIMINARY: NORMAL

## 2019-07-30 LAB
AFB CULTURE (MYCOBACTERIA): ABNORMAL
AFB CULTURE (MYCOBACTERIA): ABNORMAL
AFB SMEAR: ABNORMAL
ORGANISM: ABNORMAL
ORGANISM: ABNORMAL

## 2019-08-12 LAB
FUNGUS (MYCOLOGY) CULTURE: NORMAL
FUNGUS STAIN: NORMAL

## 2019-08-14 ENCOUNTER — TELEPHONE (OUTPATIENT)
Dept: PULMONOLOGY | Age: 39
End: 2019-08-14

## 2019-09-04 LAB
AFB CULTURE (MYCOBACTERIA): ABNORMAL
AFB SMEAR: ABNORMAL
ORGANISM: ABNORMAL

## (undated) DEVICE — SYRINGE MED 10ML POLYPR LUERSLIP TIP FLAT TOP W/O SFTY DISP

## (undated) DEVICE — SINGLE USE SUCTION VALVE MAJ-209: Brand: SINGLE USE SUCTION VALVE (STERILE)

## (undated) DEVICE — MASK, AEROSOL, ELONGATED, ADULT: Brand: MEDLINE

## (undated) DEVICE — 60 ML SYRINGE REGULAR TIP: Brand: MONOJECT

## (undated) DEVICE — AIRLIFE™ MISTY MAX 10™ NEBULIZER WITH 7 FOOT (2.1 M) CRUSH RESISTANT OXYGEN TUBING, BAFFLED TEE ADAPTER (22 MM I.D./22 MM O.D.), MOUTHPIECE AND 6 INCH (15 CM) FLEXTUBE: Brand: AIRLIFE™

## (undated) DEVICE — Z INACTIVE USE 2711638 MASK SURG WHT STD PREM NONOIL HLTH CARE PARTICULATE RESP

## (undated) DEVICE — MAJ-1414 SINGLE USE ADPATER BIOPSY VALV: Brand: SINGLE USE ADAPTOR BIOPSY VALVE

## (undated) DEVICE — SINGLE USE BIOPSY VALVE MAJ-210: Brand: SINGLE USE BIOPSY VALVE (STERILE)